# Patient Record
Sex: FEMALE | Race: WHITE | NOT HISPANIC OR LATINO | Employment: FULL TIME | URBAN - METROPOLITAN AREA
[De-identification: names, ages, dates, MRNs, and addresses within clinical notes are randomized per-mention and may not be internally consistent; named-entity substitution may affect disease eponyms.]

---

## 2022-09-01 ENCOUNTER — HOSPITAL ENCOUNTER (OUTPATIENT)
Facility: HOSPITAL | Age: 22
Discharge: HOME | End: 2022-09-01
Attending: EMERGENCY MEDICINE

## 2022-09-01 VITALS
RESPIRATION RATE: 18 BRPM | WEIGHT: 150 LBS | HEART RATE: 80 BPM | DIASTOLIC BLOOD PRESSURE: 70 MMHG | HEIGHT: 63 IN | SYSTOLIC BLOOD PRESSURE: 110 MMHG | OXYGEN SATURATION: 99 % | TEMPERATURE: 98 F | BODY MASS INDEX: 26.58 KG/M2

## 2022-09-01 DIAGNOSIS — U09.9 POST COVID-19 CONDITION, UNSPECIFIED: Primary | ICD-10-CM

## 2022-09-01 LAB
B-HCG UR QL: NEGATIVE
BILIRUB UR QL STRIP: ABNORMAL
CLARITY UR: ABNORMAL
COLOR UR: YELLOW
GLUCOSE UR STRIP-MCNC: NEGATIVE MG/DL
HGB UR QL STRIP.AUTO: NEGATIVE
KETONES UR QL STRIP: ABNORMAL
LEUKOCYTE ESTERASE UR QL STRIP.AUTO: ABNORMAL
NITRITE UR QL STRIP: NEGATIVE
PH UR STRIP.AUTO: 5.5 [PH] (ref 5–8)
PROT UR QL STRIP: ABNORMAL
SP GR UR STRIP: >=1.03 (ref 1–1.03)
UROBILINOGEN UR QL STRIP: ABNORMAL

## 2022-09-01 PROCEDURE — 81025 URINE PREGNANCY TEST: CPT | Performed by: EMERGENCY MEDICINE

## 2022-09-01 PROCEDURE — EDLOS: Performed by: EMERGENCY MEDICINE

## 2022-09-01 PROCEDURE — 81003 URINALYSIS AUTO W/O SCOPE: CPT | Performed by: EMERGENCY MEDICINE

## 2022-09-01 PROCEDURE — G0463 HOSPITAL OUTPT CLINIC VISIT: HCPCS | Performed by: EMERGENCY MEDICINE

## 2022-09-01 PROCEDURE — 63710000001 ONDANSETRON PER 8 MG: Performed by: EMERGENCY MEDICINE

## 2022-09-01 PROCEDURE — 99202 OFFICE O/P NEW SF 15 MIN: CPT | Performed by: EMERGENCY MEDICINE

## 2022-09-01 RX ORDER — ONDANSETRON 4 MG/1
4 TABLET, FILM COATED ORAL ONCE
Status: COMPLETED | OUTPATIENT
Start: 2022-09-01 | End: 2022-09-01

## 2022-09-01 RX ORDER — ONDANSETRON 4 MG/1
4 TABLET, ORALLY DISINTEGRATING ORAL EVERY 8 HOURS PRN
Qty: 8 TABLET | Refills: 0 | Status: SHIPPED | OUTPATIENT
Start: 2022-09-01

## 2022-09-01 RX ADMIN — ONDANSETRON HYDROCHLORIDE 4 MG: 4 TABLET, FILM COATED ORAL at 14:17

## 2023-04-22 ENCOUNTER — HOSPITAL ENCOUNTER (EMERGENCY)
Facility: HOSPITAL | Age: 23
Discharge: HOME | End: 2023-04-23
Attending: EMERGENCY MEDICINE
Payer: COMMERCIAL

## 2023-04-22 VITALS
RESPIRATION RATE: 18 BRPM | TEMPERATURE: 98.1 F | HEART RATE: 85 BPM | WEIGHT: 155 LBS | SYSTOLIC BLOOD PRESSURE: 103 MMHG | BODY MASS INDEX: 28.52 KG/M2 | DIASTOLIC BLOOD PRESSURE: 55 MMHG | HEIGHT: 62 IN | OXYGEN SATURATION: 100 %

## 2023-04-22 DIAGNOSIS — O21.9 NAUSEA AND VOMITING DURING PREGNANCY: Primary | ICD-10-CM

## 2023-04-22 DIAGNOSIS — E86.0 DEHYDRATION: ICD-10-CM

## 2023-04-22 LAB
ALBUMIN SERPL-MCNC: 4.1 G/DL (ref 3.5–5.2)
ALBUMIN/GLOB SERPL: 1.6 G/DL
ALP SERPL-CCNC: 51 U/L (ref 39–117)
ALT SERPL W P-5'-P-CCNC: 7 U/L (ref 1–33)
ANION GAP SERPL CALCULATED.3IONS-SCNC: 11.5 MMOL/L (ref 5–15)
AST SERPL-CCNC: 15 U/L (ref 1–32)
BASOPHILS # BLD AUTO: 0.02 10*3/MM3 (ref 0–0.2)
BASOPHILS NFR BLD AUTO: 0.3 % (ref 0–1.5)
BILIRUB SERPL-MCNC: 0.3 MG/DL (ref 0–1.2)
BILIRUB UR QL STRIP: NEGATIVE
BUN SERPL-MCNC: 5 MG/DL (ref 6–20)
BUN/CREAT SERPL: 11.1 (ref 7–25)
CALCIUM SPEC-SCNC: 8.9 MG/DL (ref 8.6–10.5)
CHLORIDE SERPL-SCNC: 102 MMOL/L (ref 98–107)
CLARITY UR: ABNORMAL
CO2 SERPL-SCNC: 20.5 MMOL/L (ref 22–29)
COLOR UR: YELLOW
CREAT SERPL-MCNC: 0.45 MG/DL (ref 0.57–1)
DEPRECATED RDW RBC AUTO: 45.7 FL (ref 37–54)
EGFRCR SERPLBLD CKD-EPI 2021: 140.6 ML/MIN/1.73
EOSINOPHIL # BLD AUTO: 0.01 10*3/MM3 (ref 0–0.4)
EOSINOPHIL NFR BLD AUTO: 0.1 % (ref 0.3–6.2)
ERYTHROCYTE [DISTWIDTH] IN BLOOD BY AUTOMATED COUNT: 14.2 % (ref 12.3–15.4)
GLOBULIN UR ELPH-MCNC: 2.6 GM/DL
GLUCOSE SERPL-MCNC: 87 MG/DL (ref 65–99)
GLUCOSE UR STRIP-MCNC: NEGATIVE MG/DL
HCT VFR BLD AUTO: 36.4 % (ref 34–46.6)
HGB BLD-MCNC: 11.8 G/DL (ref 12–15.9)
HGB UR QL STRIP.AUTO: NEGATIVE
IMM GRANULOCYTES # BLD AUTO: 0.01 10*3/MM3 (ref 0–0.05)
IMM GRANULOCYTES NFR BLD AUTO: 0.1 % (ref 0–0.5)
KETONES UR QL STRIP: ABNORMAL
LEUKOCYTE ESTERASE UR QL STRIP.AUTO: NEGATIVE
LIPASE SERPL-CCNC: 12 U/L (ref 13–60)
LYMPHOCYTES # BLD AUTO: 0.74 10*3/MM3 (ref 0.7–3.1)
LYMPHOCYTES NFR BLD AUTO: 10.9 % (ref 19.6–45.3)
MCH RBC QN AUTO: 28 PG (ref 26.6–33)
MCHC RBC AUTO-ENTMCNC: 32.4 G/DL (ref 31.5–35.7)
MCV RBC AUTO: 86.3 FL (ref 79–97)
MONOCYTES # BLD AUTO: 0.57 10*3/MM3 (ref 0.1–0.9)
MONOCYTES NFR BLD AUTO: 8.4 % (ref 5–12)
NEUTROPHILS NFR BLD AUTO: 5.43 10*3/MM3 (ref 1.7–7)
NEUTROPHILS NFR BLD AUTO: 80.2 % (ref 42.7–76)
NITRITE UR QL STRIP: NEGATIVE
PH UR STRIP.AUTO: 7 [PH] (ref 5–8)
PLATELET # BLD AUTO: 280 10*3/MM3 (ref 140–450)
PMV BLD AUTO: 9.6 FL (ref 6–12)
POTASSIUM SERPL-SCNC: 3.3 MMOL/L (ref 3.5–5.2)
PROT SERPL-MCNC: 6.7 G/DL (ref 6–8.5)
PROT UR QL STRIP: ABNORMAL
RBC # BLD AUTO: 4.22 10*6/MM3 (ref 3.77–5.28)
SODIUM SERPL-SCNC: 134 MMOL/L (ref 136–145)
SP GR UR STRIP: 1.02 (ref 1–1.03)
UROBILINOGEN UR QL STRIP: ABNORMAL
WBC NRBC COR # BLD: 6.78 10*3/MM3 (ref 3.4–10.8)

## 2023-04-22 PROCEDURE — 85025 COMPLETE CBC W/AUTO DIFF WBC: CPT | Performed by: EMERGENCY MEDICINE

## 2023-04-22 PROCEDURE — 80053 COMPREHEN METABOLIC PANEL: CPT | Performed by: EMERGENCY MEDICINE

## 2023-04-22 PROCEDURE — 96374 THER/PROPH/DIAG INJ IV PUSH: CPT

## 2023-04-22 PROCEDURE — 25010000002 ONDANSETRON PER 1 MG: Performed by: EMERGENCY MEDICINE

## 2023-04-22 PROCEDURE — 81003 URINALYSIS AUTO W/O SCOPE: CPT | Performed by: EMERGENCY MEDICINE

## 2023-04-22 PROCEDURE — 99283 EMERGENCY DEPT VISIT LOW MDM: CPT

## 2023-04-22 PROCEDURE — 83690 ASSAY OF LIPASE: CPT | Performed by: EMERGENCY MEDICINE

## 2023-04-22 RX ORDER — SODIUM CHLORIDE 0.9 % (FLUSH) 0.9 %
10 SYRINGE (ML) INJECTION AS NEEDED
Status: DISCONTINUED | OUTPATIENT
Start: 2023-04-22 | End: 2023-04-23 | Stop reason: HOSPADM

## 2023-04-22 RX ORDER — ONDANSETRON 2 MG/ML
8 INJECTION INTRAMUSCULAR; INTRAVENOUS ONCE
Status: COMPLETED | OUTPATIENT
Start: 2023-04-22 | End: 2023-04-22

## 2023-04-22 RX ADMIN — SODIUM CHLORIDE 1000 ML: 0.9 INJECTION, SOLUTION INTRAVENOUS at 23:30

## 2023-04-22 RX ADMIN — SODIUM CHLORIDE 1000 ML: 9 INJECTION, SOLUTION INTRAVENOUS at 22:07

## 2023-04-22 RX ADMIN — ONDANSETRON 8 MG: 2 INJECTION INTRAMUSCULAR; INTRAVENOUS at 22:06

## 2023-04-22 NOTE — Clinical Note
[facility] FSED [place] [id number] [place] [address] [place] [address]  Phone: [phone]    Alison Kaiser was seen and treated in our emergency department on 4/22/2023.  She may return to work on 04/24/2023.         Thank you for choosing [facility].    Jian Restrepo MD      
Never

## 2023-04-23 NOTE — FSED PROVIDER NOTE
Subjective   History of Present Illness  Patient 22-year-old woman who is approximate 11 weeks pregnant.  Patient states during her second pregnancy she had similar symptoms.  Patient has been having nausea and vomiting approximately 10-15 episodes today.  Patient noted some small streaks of blood in the emesis towards the end of her vomiting episodes.  She denies any diarrhea or fevers or abdominal pain or vaginal bleeding.  No dysuria.  Patient did reach out to her provider who did phone and Zofran however she has yet to pick this up due to insurance issues.  She feels that she will be able to  the medication tomorrow.        Review of Systems    Past Medical History:   Diagnosis Date   • Hyperemesis gravidarum        Allergies   Allergen Reactions   • Penicillins Other (See Comments)     Childhood allergy        History reviewed. No pertinent surgical history.    History reviewed. No pertinent family history.    Social History     Socioeconomic History   • Marital status:    Tobacco Use   • Smoking status: Never   • Smokeless tobacco: Never   Vaping Use   • Vaping Use: Every day   • Substances: Nicotine   • Devices: Disposable   Substance and Sexual Activity   • Alcohol use: Never   • Drug use: Yes     Frequency: 6.0 times per week     Types: Marijuana   • Sexual activity: Defer           Objective   Physical Exam  Vitals and nursing note reviewed.   Constitutional:       General: She is not in acute distress.     Appearance: Normal appearance. She is not ill-appearing or toxic-appearing.   HENT:      Head: Normocephalic and atraumatic.      Mouth/Throat:      Mouth: Mucous membranes are moist.      Pharynx: Oropharynx is clear.   Eyes:      Extraocular Movements: Extraocular movements intact.      Conjunctiva/sclera: Conjunctivae normal.      Pupils: Pupils are equal, round, and reactive to light.   Cardiovascular:      Rate and Rhythm: Normal rate and regular rhythm.      Pulses: Normal pulses.       Heart sounds: Normal heart sounds.   Pulmonary:      Effort: Pulmonary effort is normal.      Breath sounds: Normal breath sounds.   Abdominal:      General: Bowel sounds are normal.      Palpations: Abdomen is soft.      Tenderness: There is no abdominal tenderness. There is no right CVA tenderness, left CVA tenderness or guarding.   Musculoskeletal:         General: Normal range of motion.      Cervical back: Normal range of motion and neck supple.   Skin:     General: Skin is warm and dry.      Capillary Refill: Capillary refill takes less than 2 seconds.   Neurological:      General: No focal deficit present.      Mental Status: She is alert.      Sensory: No sensory deficit.      Motor: No weakness.         Procedures           ED Course                                           MDM   Patient with nausea and vomiting during her pregnancy.  She is approximate 11 weeks pregnant.  Patient states during her second pregnancy she had similar symptoms.  She had 10-15 episodes of vomiting today and noted some streaks of blood towards the end.  Does no diarrhea or fevers.  No difficulty voiding and no abdominal pain.  Regarding streaks of blood with her vomiting most likely Letitia-Cha syndrome.  Patient did feel dehydrated was given IV fluids with IV Zofran.  Patient was feeling better with the IV Zofran.  Labs were unremarkable.  Patient does have a prescription for Zofran which she feels she can  tomorrow.  Patient will return if symptoms worsen or any concerns.    Final diagnoses:   Nausea and vomiting during pregnancy   Dehydration       ED Disposition  ED Disposition     ED Disposition   Discharge    Condition   Stable    Comment   --             Hansel Valerio MD  [address] [place] [address] [phone]    In 1 week      [place] [address] [place] [id number] [phone]    If symptoms worsen         Medication  List      No changes were made to your prescriptions during this visit.

## 2023-04-23 NOTE — DISCHARGE INSTRUCTIONS
Please take Zofran as needed for nausea and vomiting.  Recommend clear liquids and ice chips for the next 6 to 12 hours and advance diet slowly as tolerated.  Seek immediate medical attention if having worsening symptoms or any concerns.

## 2023-04-23 NOTE — ED NOTES
"Pt is 11 wks pregnant . C/o of N/V . Reports 15 episodes today. HX hyperemesis Gravideria with 2 other pregnancies. \" Feeling dehydrated\" .  "

## 2023-04-30 ENCOUNTER — HOSPITAL ENCOUNTER (EMERGENCY)
Facility: HOSPITAL | Age: 23
Discharge: HOME | End: 2023-04-30
Attending: EMERGENCY MEDICINE | Admitting: EMERGENCY MEDICINE
Payer: COMMERCIAL

## 2023-04-30 VITALS
WEIGHT: 155 LBS | HEIGHT: 62 IN | RESPIRATION RATE: 18 BRPM | DIASTOLIC BLOOD PRESSURE: 60 MMHG | HEART RATE: 98 BPM | OXYGEN SATURATION: 98 % | SYSTOLIC BLOOD PRESSURE: 90 MMHG | TEMPERATURE: 98.4 F | BODY MASS INDEX: 28.52 KG/M2

## 2023-04-30 DIAGNOSIS — O21.9 NAUSEA AND VOMITING IN PREGNANCY PRIOR TO 22 WEEKS GESTATION: Primary | ICD-10-CM

## 2023-04-30 DIAGNOSIS — Z3A.13 PREGNANCY WITH 13 COMPLETED WEEKS GESTATION: ICD-10-CM

## 2023-04-30 LAB
ANION GAP SERPL CALCULATED.3IONS-SCNC: 9 MMOL/L (ref 5–15)
BASOPHILS # BLD AUTO: 0.02 10*3/MM3 (ref 0–0.2)
BASOPHILS NFR BLD AUTO: 0.2 % (ref 0–1.5)
BUN SERPL-MCNC: 5 MG/DL (ref 6–20)
BUN/CREAT SERPL: 10.6 (ref 7–25)
CALCIUM SPEC-SCNC: 9 MG/DL (ref 8.6–10.5)
CHLORIDE SERPL-SCNC: 103 MMOL/L (ref 98–107)
CO2 SERPL-SCNC: 22 MMOL/L (ref 22–29)
CREAT SERPL-MCNC: 0.47 MG/DL (ref 0.57–1)
DEPRECATED RDW RBC AUTO: 46 FL (ref 37–54)
EGFRCR SERPLBLD CKD-EPI 2021: 139.1 ML/MIN/1.73
EOSINOPHIL # BLD AUTO: 0.01 10*3/MM3 (ref 0–0.4)
EOSINOPHIL NFR BLD AUTO: 0.1 % (ref 0.3–6.2)
ERYTHROCYTE [DISTWIDTH] IN BLOOD BY AUTOMATED COUNT: 14.2 % (ref 12.3–15.4)
GLUCOSE SERPL-MCNC: 92 MG/DL (ref 65–99)
HCG INTACT+B SERPL-ACNC: NORMAL MIU/ML
HCT VFR BLD AUTO: 40.3 % (ref 34–46.6)
HGB BLD-MCNC: 13.1 G/DL (ref 12–15.9)
IMM GRANULOCYTES # BLD AUTO: 0.02 10*3/MM3 (ref 0–0.05)
IMM GRANULOCYTES NFR BLD AUTO: 0.2 % (ref 0–0.5)
LYMPHOCYTES # BLD AUTO: 1.06 10*3/MM3 (ref 0.7–3.1)
LYMPHOCYTES NFR BLD AUTO: 10.2 % (ref 19.6–45.3)
MCH RBC QN AUTO: 28.1 PG (ref 26.6–33)
MCHC RBC AUTO-ENTMCNC: 32.5 G/DL (ref 31.5–35.7)
MCV RBC AUTO: 86.3 FL (ref 79–97)
MONOCYTES # BLD AUTO: 0.38 10*3/MM3 (ref 0.1–0.9)
MONOCYTES NFR BLD AUTO: 3.6 % (ref 5–12)
NEUTROPHILS NFR BLD AUTO: 8.95 10*3/MM3 (ref 1.7–7)
NEUTROPHILS NFR BLD AUTO: 85.7 % (ref 42.7–76)
PLATELET # BLD AUTO: 326 10*3/MM3 (ref 140–450)
PMV BLD AUTO: 9.5 FL (ref 6–12)
POTASSIUM SERPL-SCNC: 3.7 MMOL/L (ref 3.5–5.2)
RBC # BLD AUTO: 4.67 10*6/MM3 (ref 3.77–5.28)
SODIUM SERPL-SCNC: 134 MMOL/L (ref 136–145)
WBC NRBC COR # BLD: 10.44 10*3/MM3 (ref 3.4–10.8)

## 2023-04-30 PROCEDURE — 96374 THER/PROPH/DIAG INJ IV PUSH: CPT

## 2023-04-30 PROCEDURE — 80048 BASIC METABOLIC PNL TOTAL CA: CPT | Performed by: NURSE PRACTITIONER

## 2023-04-30 PROCEDURE — 99283 EMERGENCY DEPT VISIT LOW MDM: CPT

## 2023-04-30 PROCEDURE — 85025 COMPLETE CBC W/AUTO DIFF WBC: CPT | Performed by: NURSE PRACTITIONER

## 2023-04-30 PROCEDURE — 84702 CHORIONIC GONADOTROPIN TEST: CPT | Performed by: NURSE PRACTITIONER

## 2023-04-30 PROCEDURE — 96361 HYDRATE IV INFUSION ADD-ON: CPT

## 2023-04-30 PROCEDURE — 25010000002 ONDANSETRON PER 1 MG: Performed by: NURSE PRACTITIONER

## 2023-04-30 RX ORDER — DEXTROSE AND SODIUM CHLORIDE 5; .9 G/100ML; G/100ML
500 INJECTION, SOLUTION INTRAVENOUS CONTINUOUS
Status: DISCONTINUED | OUTPATIENT
Start: 2023-04-30 | End: 2023-04-30 | Stop reason: HOSPADM

## 2023-04-30 RX ORDER — ONDANSETRON 2 MG/ML
8 INJECTION INTRAMUSCULAR; INTRAVENOUS ONCE
Status: COMPLETED | OUTPATIENT
Start: 2023-04-30 | End: 2023-04-30

## 2023-04-30 RX ORDER — SODIUM CHLORIDE 0.9 % (FLUSH) 0.9 %
10 SYRINGE (ML) INJECTION AS NEEDED
Status: DISCONTINUED | OUTPATIENT
Start: 2023-04-30 | End: 2023-04-30 | Stop reason: HOSPADM

## 2023-04-30 RX ADMIN — DEXTROSE AND SODIUM CHLORIDE 500 ML/HR: 5; .9 INJECTION, SOLUTION INTRAVENOUS at 17:19

## 2023-04-30 RX ADMIN — ONDANSETRON 8 MG: 2 INJECTION INTRAMUSCULAR; INTRAVENOUS at 17:19

## 2023-04-30 NOTE — DISCHARGE INSTRUCTIONS
THIS LIST WILL HELP YOU TO KNOW SOME OVER-THE-COUNTER MEDICATIONS YOU CAN TAKE DURING PREGNANCY    (Lista de medicinas que puede arben samuel el embarazo)    COLD (Cararro):       PAINS AND ACHES (Ame):  Robitussin      Tylenol   Actifed       Tylenol extra strength  Tylenol Cold          DIARRHEA (diarrea):    HEADACHES (dolor de dileep):  Fibercon      Tylenol extra strength  Clear liquids or jello     HEARTBURN/INDIGESTION:   SWELLING (Inchazon):  Maalox, Mylanta, Tums, Zantac   No salt         (inchazon) no fast food or junk food         No sodas         No canned soups    TO SLEEP (Para dormir):    YEAST INFECTION:  Tylenol PM      Monistat 7 or Monistat 3 (generic ok)  Benadryl 25 mg      CONSTIPATION:  Metamucil  Eat very little rice (coma poco arroz)  Add in Prune or Prune juice (Ciruelas, jugo de cireuelas)  8 glasses of water daily (8 vasos de agua al raffy)  Colace (stool softners) generic ok    HEMORRHOIDS (Hemorroides):   SEASONAL ALLERGIES:  Preparation H, Anusol     Benadryl, Zyrtec, Claritin               Return Precautions    Although you are being discharged from the ED today, I encourage you to return for worsening symptoms.  Things can, and do, change such that treatment at home with medication may not be adequate.      Specifically, return for any of the following:    Chest pain, shortness of breath, pain or nausea and vomiting not controlled by medications provided.    Please make a follow up with your Primary Care Provider for a blood pressure recheck.

## 2023-05-07 ENCOUNTER — HOSPITAL ENCOUNTER (EMERGENCY)
Facility: HOSPITAL | Age: 23
Discharge: HOME | End: 2023-05-07
Attending: EMERGENCY MEDICINE | Admitting: EMERGENCY MEDICINE
Payer: COMMERCIAL

## 2023-05-07 VITALS
RESPIRATION RATE: 19 BRPM | OXYGEN SATURATION: 98 % | DIASTOLIC BLOOD PRESSURE: 68 MMHG | TEMPERATURE: 98.3 F | HEIGHT: 62 IN | HEART RATE: 125 BPM | SYSTOLIC BLOOD PRESSURE: 102 MMHG | BODY MASS INDEX: 26.68 KG/M2 | WEIGHT: 145 LBS

## 2023-05-07 DIAGNOSIS — O21.0 HYPEREMESIS GRAVIDARUM: Primary | ICD-10-CM

## 2023-05-07 LAB
ALBUMIN SERPL-MCNC: 3.9 G/DL (ref 3.5–5.2)
ALBUMIN/GLOB SERPL: 1.4 G/DL
ALP SERPL-CCNC: 50 U/L (ref 39–117)
ALT SERPL W P-5'-P-CCNC: 5 U/L (ref 1–33)
ANION GAP SERPL CALCULATED.3IONS-SCNC: 10 MMOL/L (ref 5–15)
AST SERPL-CCNC: 10 U/L (ref 1–32)
BASOPHILS # BLD AUTO: 0 10*3/MM3 (ref 0–0.2)
BASOPHILS NFR BLD AUTO: 0 % (ref 0–1.5)
BILIRUB SERPL-MCNC: 0.3 MG/DL (ref 0–1.2)
BUN SERPL-MCNC: 4 MG/DL (ref 6–20)
BUN/CREAT SERPL: 8.5 (ref 7–25)
CALCIUM SPEC-SCNC: 8.9 MG/DL (ref 8.6–10.5)
CHLORIDE SERPL-SCNC: 102 MMOL/L (ref 98–107)
CO2 SERPL-SCNC: 21 MMOL/L (ref 22–29)
CREAT SERPL-MCNC: 0.47 MG/DL (ref 0.57–1)
DEPRECATED RDW RBC AUTO: 46.6 FL (ref 37–54)
EGFRCR SERPLBLD CKD-EPI 2021: 139.1 ML/MIN/1.73
EOSINOPHIL # BLD AUTO: 0 10*3/MM3 (ref 0–0.4)
EOSINOPHIL NFR BLD AUTO: 0 % (ref 0.3–6.2)
ERYTHROCYTE [DISTWIDTH] IN BLOOD BY AUTOMATED COUNT: 14.5 % (ref 12.3–15.4)
GLOBULIN UR ELPH-MCNC: 2.8 GM/DL
GLUCOSE SERPL-MCNC: 87 MG/DL (ref 65–99)
HCT VFR BLD AUTO: 37 % (ref 34–46.6)
HGB BLD-MCNC: 12 G/DL (ref 12–15.9)
IMM GRANULOCYTES # BLD AUTO: 0.02 10*3/MM3 (ref 0–0.05)
IMM GRANULOCYTES NFR BLD AUTO: 0.2 % (ref 0–0.5)
LYMPHOCYTES # BLD AUTO: 0.87 10*3/MM3 (ref 0.7–3.1)
LYMPHOCYTES NFR BLD AUTO: 9 % (ref 19.6–45.3)
MCH RBC QN AUTO: 27.9 PG (ref 26.6–33)
MCHC RBC AUTO-ENTMCNC: 32.4 G/DL (ref 31.5–35.7)
MCV RBC AUTO: 86 FL (ref 79–97)
MONOCYTES # BLD AUTO: 0.73 10*3/MM3 (ref 0.1–0.9)
MONOCYTES NFR BLD AUTO: 7.5 % (ref 5–12)
NEUTROPHILS NFR BLD AUTO: 8.07 10*3/MM3 (ref 1.7–7)
NEUTROPHILS NFR BLD AUTO: 83.3 % (ref 42.7–76)
PLATELET # BLD AUTO: 263 10*3/MM3 (ref 140–450)
PMV BLD AUTO: 10.2 FL (ref 6–12)
POTASSIUM SERPL-SCNC: 3.4 MMOL/L (ref 3.5–5.2)
PROT SERPL-MCNC: 6.7 G/DL (ref 6–8.5)
RBC # BLD AUTO: 4.3 10*6/MM3 (ref 3.77–5.28)
SODIUM SERPL-SCNC: 133 MMOL/L (ref 136–145)
WBC NRBC COR # BLD: 9.69 10*3/MM3 (ref 3.4–10.8)

## 2023-05-07 PROCEDURE — 96375 TX/PRO/DX INJ NEW DRUG ADDON: CPT

## 2023-05-07 PROCEDURE — 96361 HYDRATE IV INFUSION ADD-ON: CPT

## 2023-05-07 PROCEDURE — 85025 COMPLETE CBC W/AUTO DIFF WBC: CPT | Performed by: EMERGENCY MEDICINE

## 2023-05-07 PROCEDURE — 96374 THER/PROPH/DIAG INJ IV PUSH: CPT

## 2023-05-07 PROCEDURE — 25010000002 METOCLOPRAMIDE PER 10 MG: Performed by: EMERGENCY MEDICINE

## 2023-05-07 PROCEDURE — 99283 EMERGENCY DEPT VISIT LOW MDM: CPT

## 2023-05-07 PROCEDURE — 25010000002 DIPHENHYDRAMINE PER 50 MG: Performed by: EMERGENCY MEDICINE

## 2023-05-07 PROCEDURE — 80053 COMPREHEN METABOLIC PANEL: CPT | Performed by: EMERGENCY MEDICINE

## 2023-05-07 RX ORDER — SODIUM CHLORIDE, SODIUM LACTATE, POTASSIUM CHLORIDE, CALCIUM CHLORIDE 600; 310; 30; 20 MG/100ML; MG/100ML; MG/100ML; MG/100ML
1000 INJECTION, SOLUTION INTRAVENOUS CONTINUOUS
Status: DISPENSED | OUTPATIENT
Start: 2023-05-07 | End: 2023-05-07

## 2023-05-07 RX ORDER — DIPHENHYDRAMINE HYDROCHLORIDE 50 MG/ML
25 INJECTION INTRAMUSCULAR; INTRAVENOUS ONCE
Status: COMPLETED | OUTPATIENT
Start: 2023-05-07 | End: 2023-05-07

## 2023-05-07 RX ORDER — METOCLOPRAMIDE HYDROCHLORIDE 5 MG/ML
10 INJECTION INTRAMUSCULAR; INTRAVENOUS ONCE
Status: COMPLETED | OUTPATIENT
Start: 2023-05-07 | End: 2023-05-07

## 2023-05-07 RX ORDER — ONDANSETRON 4 MG/1
4 TABLET, ORALLY DISINTEGRATING ORAL EVERY 6 HOURS PRN
Qty: 12 TABLET | Refills: 0 | Status: SHIPPED | OUTPATIENT
Start: 2023-05-07

## 2023-05-07 RX ORDER — DOXYLAMINE SUCCINATE AND PYRIDOXINE HYDROCHLORIDE, DELAYED RELEASE TABLETS 10 MG/10 MG 10; 10 MG/1; MG/1
2 TABLET, DELAYED RELEASE ORAL 2 TIMES DAILY PRN
Qty: 30 TABLET | Refills: 0 | Status: SHIPPED | OUTPATIENT
Start: 2023-05-07

## 2023-05-07 RX ADMIN — METOCLOPRAMIDE HYDROCHLORIDE 10 MG: 5 INJECTION INTRAMUSCULAR; INTRAVENOUS at 16:48

## 2023-05-07 RX ADMIN — DIPHENHYDRAMINE HYDROCHLORIDE 25 MG: 50 INJECTION, SOLUTION INTRAMUSCULAR; INTRAVENOUS at 16:48

## 2023-05-07 RX ADMIN — SODIUM CHLORIDE, POTASSIUM CHLORIDE, SODIUM LACTATE AND CALCIUM CHLORIDE 1000 ML/HR: 600; 310; 30; 20 INJECTION, SOLUTION INTRAVENOUS at 16:49

## 2023-05-07 NOTE — DISCHARGE INSTRUCTIONS
Take medication as prescribed for nausea and vomiting.  Return to the emergency room for any concerns.  Follow-up with PCP as needed.  Ice chips, clear liquid diet, slowly advance as tolerated.

## 2023-05-07 NOTE — FSED PROVIDER NOTE
Subjective   History of Present Illness  The patient is a 22-year-old female who is a G3, P2 at approximately 15 weeks gestation, who presents emergency room with complaints of nausea and vomiting related to her pregnancy.  Patient states that she had the same thing with her previous daughter.  She reports that her symptoms are so bad that she has a Zofran pump that she uses at home.  Patient reports severe upper abdominal cramping and nausea with vomiting.  Patient has had frequent episodes of emesis.  She states that her Zofran is not helping.  She is here for evaluation.        Review of Systems   Constitutional: Negative.  Negative for chills, fatigue and fever.   Eyes: Negative.    Respiratory: Negative for cough, chest tightness and shortness of breath.    Cardiovascular: Negative for chest pain and palpitations.   Gastrointestinal: Positive for abdominal pain, nausea and vomiting. Negative for diarrhea.   Genitourinary: Negative.    Musculoskeletal: Negative.    Skin: Negative.  Negative for rash.   Neurological: Negative.  Negative for syncope, weakness, numbness and headaches.   Psychiatric/Behavioral: Negative.    All other systems reviewed and are negative.      Past Medical History:   Diagnosis Date   • Hyperemesis gravidarum        Allergies   Allergen Reactions   • Penicillins Other (See Comments)     Childhood allergy        History reviewed. No pertinent surgical history.    History reviewed. No pertinent family history.    Social History     Socioeconomic History   • Marital status:    Tobacco Use   • Smoking status: Never   • Smokeless tobacco: Never   Vaping Use   • Vaping Use: Every day   • Substances: Nicotine   • Devices: Disposable   Substance and Sexual Activity   • Alcohol use: Never   • Drug use: Yes     Frequency: 6.0 times per week     Types: Marijuana   • Sexual activity: Defer           Objective   Physical Exam  Vitals and nursing note reviewed.   Constitutional:       General:  She is not in acute distress.     Appearance: Normal appearance. She is normal weight.   HENT:      Right Ear: External ear normal.      Left Ear: External ear normal.      Nose: Nose normal.   Neck:      Vascular: No carotid bruit.   Cardiovascular:      Rate and Rhythm: Regular rhythm. Tachycardia present.      Pulses: Normal pulses.   Pulmonary:      Effort: Pulmonary effort is normal.   Abdominal:      General: Abdomen is flat. Bowel sounds are normal. There is no distension.      Palpations: There is no mass.      Tenderness: There is no abdominal tenderness. There is no guarding.   Musculoskeletal:         General: Normal range of motion.      Cervical back: Normal range of motion.   Skin:     Capillary Refill: Capillary refill takes less than 2 seconds.   Neurological:      General: No focal deficit present.      Mental Status: She is alert and oriented to person, place, and time.   Psychiatric:         Mood and Affect: Mood normal.         Procedures           ED Course  ED Course as of 05/08/23 0736   Sun May 07, 2023   1712 IV fluids ordered.  Reglan and Benadryl for nausea.  Labs requested. [KZ]   1752 Labs look fine.  No significant abnormality. [KZ]      ED Course User Index  [KZ] Lam Lawson MD                                           Medical Decision Making  This patient with nausea and vomiting which is likely secondary to hyperemesis gravidarum.  Considered but low risk for SBO (normal BM, passing flatus, no abdominal surgeries).   Low suspicion for gastric or esophageal dysmotility as cause. Patient with no chest pain. Based on history and exam low suspicion for pancreatitis, appendicitis, biliary pathology, or other emergent problem.  Screening labs have been requested.  Patient has been given IV fluids and IV antiemetics.  Further work-up based on lab testing results.    Labs look fine.  Patient feels better after fluids.      Hyperemesis gravidarum: chronic illness or injury with  exacerbation, progression, or side effects of treatment  Amount and/or Complexity of Data Reviewed  Labs: ordered. Decision-making details documented in ED Course.      Risk  Prescription drug management.          Final diagnoses:   Hyperemesis gravidarum       ED Disposition  ED Disposition     ED Disposition   Discharge    Condition   Stable    Comment   --             Your obstetrician    Schedule an appointment as soon as possible for a visit in 2 days  For repeat evaluation         Medication List      New Prescriptions    doxylamine-pyridoxine 10-10 MG tablet delayed-release EC tablet  Commonly known as: DICLEGIS  Take 2 tablets by mouth 2 (Two) Times a Day As Needed (nausea and vomiting).        Changed    ondansetron ODT 4 MG disintegrating tablet  Commonly known as: ZOFRAN-ODT  Place 1 tablet on the tongue Every 6 (Six) Hours As Needed for Nausea or Vomiting.  What changed: when to take this           Where to Get Your Medications      These medications were sent to [facility] [id number] - [place], [place] - [address] [facility] [phone]  - [phone] FX  [address] [place] [address]    Phone: [phone]   · doxylamine-pyridoxine 10-10 MG tablet delayed-release EC tablet  · ondansetron ODT 4 MG disintegrating tablet

## 2023-11-09 ENCOUNTER — HOSPITAL ENCOUNTER (EMERGENCY)
Facility: HOSPITAL | Age: 23
Discharge: HOME | End: 2023-11-09
Attending: EMERGENCY MEDICINE
Payer: COMMERCIAL

## 2023-11-09 VITALS
OXYGEN SATURATION: 97 % | BODY MASS INDEX: 23.92 KG/M2 | RESPIRATION RATE: 20 BRPM | HEART RATE: 110 BPM | TEMPERATURE: 98.3 F | HEIGHT: 62 IN | WEIGHT: 130 LBS | DIASTOLIC BLOOD PRESSURE: 72 MMHG | SYSTOLIC BLOOD PRESSURE: 102 MMHG

## 2023-11-09 DIAGNOSIS — R11.2 NAUSEA AND VOMITING, UNSPECIFIED VOMITING TYPE: Primary | ICD-10-CM

## 2023-11-09 LAB
ALBUMIN SERPL-MCNC: 4.4 G/DL (ref 3.5–5.2)
ALBUMIN/GLOB SERPL: 1.6 G/DL
ALP SERPL-CCNC: 92 U/L (ref 39–117)
ALT SERPL W P-5'-P-CCNC: 15 U/L (ref 1–33)
ANION GAP SERPL CALCULATED.3IONS-SCNC: 10.2 MMOL/L (ref 5–15)
AST SERPL-CCNC: 19 U/L (ref 1–32)
B-HCG UR QL: NEGATIVE
BASOPHILS # BLD AUTO: 0 10*3/MM3 (ref 0–0.2)
BASOPHILS NFR BLD AUTO: 0.2 % (ref 0–1.5)
BILIRUB SERPL-MCNC: 0.3 MG/DL (ref 0–1.2)
BILIRUB UR QL STRIP: NEGATIVE
BUN SERPL-MCNC: 15 MG/DL (ref 6–20)
BUN/CREAT SERPL: 19.5 (ref 7–25)
CALCIUM SPEC-SCNC: 9.4 MG/DL (ref 8.6–10.5)
CHLORIDE SERPL-SCNC: 102 MMOL/L (ref 98–107)
CLARITY UR: ABNORMAL
CO2 SERPL-SCNC: 27.8 MMOL/L (ref 22–29)
COLOR UR: ABNORMAL
CREAT SERPL-MCNC: 0.77 MG/DL (ref 0.57–1)
DEPRECATED RDW RBC AUTO: 52.9 FL (ref 37–54)
EGFRCR SERPLBLD CKD-EPI 2021: 112 ML/MIN/1.73
EOSINOPHIL # BLD AUTO: 0.2 10*3/MM3 (ref 0–0.4)
EOSINOPHIL NFR BLD AUTO: 1.1 % (ref 0.3–6.2)
ERYTHROCYTE [DISTWIDTH] IN BLOOD BY AUTOMATED COUNT: 18.3 % (ref 12.3–15.4)
GLOBULIN UR ELPH-MCNC: 2.8 GM/DL
GLUCOSE SERPL-MCNC: 105 MG/DL (ref 65–99)
GLUCOSE UR STRIP-MCNC: NEGATIVE MG/DL
HCT VFR BLD AUTO: 44 % (ref 34–46.6)
HGB BLD-MCNC: 14.2 G/DL (ref 12–15.9)
HGB UR QL STRIP.AUTO: ABNORMAL
KETONES UR QL STRIP: ABNORMAL
LEUKOCYTE ESTERASE UR QL STRIP.AUTO: ABNORMAL
LYMPHOCYTES # BLD AUTO: 1.7 10*3/MM3 (ref 0.7–3.1)
LYMPHOCYTES NFR BLD AUTO: 11.7 % (ref 19.6–45.3)
MCH RBC QN AUTO: 25.3 PG (ref 26.6–33)
MCHC RBC AUTO-ENTMCNC: 32.4 G/DL (ref 31.5–35.7)
MCV RBC AUTO: 78.2 FL (ref 79–97)
MONOCYTES # BLD AUTO: 0.6 10*3/MM3 (ref 0.1–0.9)
MONOCYTES NFR BLD AUTO: 4.4 % (ref 5–12)
NEUTROPHILS NFR BLD AUTO: 11.9 10*3/MM3 (ref 1.7–7)
NEUTROPHILS NFR BLD AUTO: 82.6 % (ref 42.7–76)
NITRITE UR QL STRIP: NEGATIVE
NRBC BLD AUTO-RTO: 0.1 /100 WBC (ref 0–0.2)
PH UR STRIP.AUTO: 5.5 [PH] (ref 5–8)
PLATELET # BLD AUTO: 358 10*3/MM3 (ref 140–450)
PMV BLD AUTO: 7.8 FL (ref 6–12)
POTASSIUM SERPL-SCNC: 4.3 MMOL/L (ref 3.5–5.2)
PROT SERPL-MCNC: 7.2 G/DL (ref 6–8.5)
PROT UR QL STRIP: ABNORMAL
RBC # BLD AUTO: 5.63 10*6/MM3 (ref 3.77–5.28)
SODIUM SERPL-SCNC: 140 MMOL/L (ref 136–145)
SP GR UR STRIP: >=1.03 (ref 1–1.03)
UROBILINOGEN UR QL STRIP: ABNORMAL
WBC NRBC COR # BLD: 14.4 10*3/MM3 (ref 3.4–10.8)

## 2023-11-09 PROCEDURE — 96374 THER/PROPH/DIAG INJ IV PUSH: CPT

## 2023-11-09 PROCEDURE — 25010000002 ONDANSETRON PER 1 MG

## 2023-11-09 PROCEDURE — 81025 URINE PREGNANCY TEST: CPT

## 2023-11-09 PROCEDURE — 85025 COMPLETE CBC W/AUTO DIFF WBC: CPT

## 2023-11-09 PROCEDURE — 25810000003 SODIUM CHLORIDE 0.9 % SOLUTION

## 2023-11-09 PROCEDURE — 96375 TX/PRO/DX INJ NEW DRUG ADDON: CPT

## 2023-11-09 PROCEDURE — 25010000002 KETOROLAC TROMETHAMINE PER 15 MG

## 2023-11-09 PROCEDURE — 99283 EMERGENCY DEPT VISIT LOW MDM: CPT

## 2023-11-09 PROCEDURE — 81003 URINALYSIS AUTO W/O SCOPE: CPT

## 2023-11-09 PROCEDURE — 80053 COMPREHEN METABOLIC PANEL: CPT

## 2023-11-09 PROCEDURE — 96361 HYDRATE IV INFUSION ADD-ON: CPT

## 2023-11-09 RX ORDER — ONDANSETRON 4 MG/1
8 TABLET, ORALLY DISINTEGRATING ORAL EVERY 8 HOURS PRN
Qty: 12 TABLET | Refills: 0 | Status: SHIPPED | OUTPATIENT
Start: 2023-11-09

## 2023-11-09 RX ORDER — PROMETHAZINE HYDROCHLORIDE 25 MG/1
25 SUPPOSITORY RECTAL EVERY 6 HOURS PRN
Qty: 12 SUPPOSITORY | Refills: 0 | Status: SHIPPED | OUTPATIENT
Start: 2023-11-09

## 2023-11-09 RX ORDER — KETOROLAC TROMETHAMINE 30 MG/ML
30 INJECTION, SOLUTION INTRAMUSCULAR; INTRAVENOUS ONCE
Status: COMPLETED | OUTPATIENT
Start: 2023-11-09 | End: 2023-11-09

## 2023-11-09 RX ORDER — ONDANSETRON 2 MG/ML
8 INJECTION INTRAMUSCULAR; INTRAVENOUS ONCE
Status: COMPLETED | OUTPATIENT
Start: 2023-11-09 | End: 2023-11-09

## 2023-11-09 RX ORDER — SODIUM CHLORIDE 0.9 % (FLUSH) 0.9 %
10 SYRINGE (ML) INJECTION AS NEEDED
Status: DISCONTINUED | OUTPATIENT
Start: 2023-11-09 | End: 2023-11-09 | Stop reason: HOSPADM

## 2023-11-09 RX ORDER — FAMOTIDINE 10 MG/ML
20 INJECTION, SOLUTION INTRAVENOUS ONCE
Status: COMPLETED | OUTPATIENT
Start: 2023-11-09 | End: 2023-11-09

## 2023-11-09 RX ADMIN — KETOROLAC TROMETHAMINE 30 MG: 30 INJECTION, SOLUTION INTRAMUSCULAR; INTRAVENOUS at 18:10

## 2023-11-09 RX ADMIN — ONDANSETRON 8 MG: 2 INJECTION INTRAMUSCULAR; INTRAVENOUS at 18:10

## 2023-11-09 RX ADMIN — SODIUM CHLORIDE 1000 ML: 9 INJECTION, SOLUTION INTRAVENOUS at 18:11

## 2023-11-09 RX ADMIN — FAMOTIDINE 20 MG: 10 INJECTION, SOLUTION INTRAVENOUS at 18:10

## 2023-11-09 NOTE — FSED PROVIDER NOTE
Subjective   History of Present Illness  Patient is a 23-year-old female who presents to the emergency department for vomiting that onset several hours prior to arrival around 2 PM.  Associated body aches, cold chills, hot sweats.  Patient reports she was feeling fine prior and over the past several days.  Denies abdominal pain, diarrhea, constipation, any upper respiratory symptoms.  Patient recently gave birth x5 weeks ago.  Lives in sober living group home.  Reports problem with chronic constipation and irregular bowel movements.  Takes stool softener daily.  Denies any acute concerns.  Last bowel movement yesterday.        Review of Systems   Constitutional:  Positive for chills. Negative for fatigue and fever.   HENT:  Negative for congestion, rhinorrhea and sore throat.    Eyes:  Negative for pain and redness.   Respiratory:  Negative for cough and shortness of breath.    Cardiovascular:  Negative for chest pain.   Gastrointestinal:  Positive for nausea and vomiting. Negative for abdominal pain, constipation and diarrhea.   Genitourinary:  Positive for vaginal bleeding. Negative for decreased urine volume, difficulty urinating and dysuria.   Musculoskeletal:  Positive for myalgias. Negative for back pain, neck pain and neck stiffness.   Skin:  Negative for color change, pallor, rash and wound.   Neurological:  Negative for syncope, weakness, light-headedness and numbness.       Past Medical History:   Diagnosis Date    Hyperemesis gravidarum        No Known Allergies    History reviewed. No pertinent surgical history.    History reviewed. No pertinent family history.    Social History     Socioeconomic History    Marital status:    Tobacco Use    Smoking status: Never    Smokeless tobacco: Never   Vaping Use    Vaping Use: Every day    Substances: Nicotine    Devices: Disposable   Substance and Sexual Activity    Alcohol use: Never    Drug use: Yes     Frequency: 6.0 times per week     Types: Marijuana     Sexual activity: Defer           Objective   Physical Exam  Constitutional:       Appearance: She is normal weight. She is ill-appearing. She is not toxic-appearing.      Comments: Patient lying in fetal position covered in blanket and emesis bag at side.   HENT:      Head: Normocephalic and atraumatic.   Pulmonary:      Effort: Pulmonary effort is normal. No respiratory distress.   Abdominal:      General: Abdomen is flat. There is no distension.      Palpations: Abdomen is soft. There is no mass.      Tenderness: There is no abdominal tenderness. There is no guarding or rebound.      Hernia: No hernia is present.   Musculoskeletal:         General: Normal range of motion.      Cervical back: Normal range of motion.   Skin:     General: Skin is warm and dry.      Findings: No bruising, erythema, lesion or rash.   Neurological:      General: No focal deficit present.      Mental Status: She is alert and oriented to person, place, and time.   Psychiatric:         Mood and Affect: Mood normal.         Behavior: Behavior normal.         Procedures           ED Course  ED Course as of 11/09/23 1952   Thu Nov 09, 2023 1931 Patient reports feeling significantly improved after fluids, Pepcid, Toradol, Zofran IV.  CMP unremarkable.  Urine reveals trace leukocytes and ketones.  Other findings of proteinuria, blood likely due to patient still spotting from recent vaginal delivery.  No urinary symptoms.  Will wait for urine culture.  Unable to result CBC due to equipment malfunction and has to be send out.  Patient symptoms onset just prior to arrival and there are no acute findings on exam unlikely to have major bacterial surgical or medical emergency.  More likely viral illness.  Strict return precautions given.  Will discharge patient.  Very thorough hygiene practices discussed and emphasized due to patient's group living and 5-week-old baby. [AS]      ED Course User Index  [AS] Giuliana Kaplan, SHABANA                                            Medical Decision Making  Patient is a 23-year-old female who presents for vomiting, chills, body aches that onset just prior to arrival.  Patient appears ill but nontoxic.  Vital signs are WNL mildly tachycardic.  Abdomen is soft, nontender.  Patient significantly improved after IV fluids and medications.  CMP unremarkable.  CBC not resulted due to equipment malfunction.  However, considered low risk for bacterial illness in this clinical picture.  Will discharge without.  Thoroughly discussed hygiene measures to prevent infection of other roommates and baby.  Discussed when to return to the emergency department.  Answered all questions.  Patient verbalized understanding and was agreeable to plan and discharge.    My differential diagnosis includes but is not limited to: Viral illness, bacterial illness, gastritis, food poisoning, electrolyte abnormality, allergy, food intolerance, SBO, intussusception    Amount and/or Complexity of Data Reviewed  Labs: ordered.    Risk  Prescription drug management.        Final diagnoses:   Nausea and vomiting, unspecified vomiting type       ED Disposition  ED Disposition       ED Disposition   Discharge    Condition   Stable    Comment   --               Hansel Valerio MD  [address] [place] [address]  [phone]    Schedule an appointment as soon as possible for a visit            Medication List        New Prescriptions      promethazine 25 MG suppository  Commonly known as: PHENERGAN  Insert 1 suppository into the rectum Every 6 (Six) Hours As Needed for Nausea or Vomiting.            Changed      ondansetron ODT 4 MG disintegrating tablet  Commonly known as: ZOFRAN-ODT  Place 2 tablets on the tongue Every 8 (Eight) Hours As Needed for Nausea or Vomiting.  What changed:   how much to take  when to take this               Where to Get Your Medications        These medications were sent to [facility] [id number] -  [place], [place] - [address] [facility] - [phone]  - [phone] FX  [address] [facility], [place] [address]      Phone: [phone]   ondansetron ODT 4 MG disintegrating tablet  promethazine 25 MG suppository

## 2023-11-10 NOTE — DISCHARGE INSTRUCTIONS
Return to emergency department for worsening symptoms or other medical emergencies.  Recommended follow-up with PCP.  Keep appointment with OB/GYN.  Refer to the attached instructions for further information.    Patient okay to take:  -Ibuprofen 200 mg tablets: 1 to 4 tablets every 8 hours as needed for fever or pain  -Tums: 1 to 2 tablets twice a day as needed for upset stomach  -Phenergan and Zofran as prescribed as needed for nausea and vomiting

## 2024-04-03 ENCOUNTER — HOSPITAL ENCOUNTER (OUTPATIENT)
Facility: HOSPITAL | Age: 24
Discharge: HOME | End: 2024-04-03
Attending: EMERGENCY MEDICINE | Admitting: EMERGENCY MEDICINE
Payer: MEDICAID

## 2024-04-03 ENCOUNTER — APPOINTMENT (OUTPATIENT)
Dept: GENERAL RADIOLOGY | Facility: HOSPITAL | Age: 24
End: 2024-04-03
Payer: MEDICAID

## 2024-04-03 VITALS
SYSTOLIC BLOOD PRESSURE: 115 MMHG | DIASTOLIC BLOOD PRESSURE: 68 MMHG | RESPIRATION RATE: 18 BRPM | HEART RATE: 106 BPM | BODY MASS INDEX: 25.16 KG/M2 | TEMPERATURE: 98.4 F | WEIGHT: 142 LBS | OXYGEN SATURATION: 99 % | HEIGHT: 63 IN

## 2024-04-03 DIAGNOSIS — S32.10XA SACRUM AND COCCYX FRACTURE, CLOSED, INITIAL ENCOUNTER: ICD-10-CM

## 2024-04-03 DIAGNOSIS — S32.2XXA SACRUM AND COCCYX FRACTURE, CLOSED, INITIAL ENCOUNTER: ICD-10-CM
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
## 2024-04-03 PROCEDURE — 99213 OFFICE O/P EST LOW 20 MIN: CPT | Performed by: NURSE PRACTITIONER

## 2024-04-03 PROCEDURE — 72220 X-RAY EXAM SACRUM TAILBONE: CPT

## 2024-04-03 PROCEDURE — G0463 HOSPITAL OUTPT CLINIC VISIT: HCPCS | Performed by: NURSE PRACTITIONER

## 2024-04-03 RX ORDER — ACETAMINOPHEN 500 MG
1000 TABLET ORAL EVERY 6 HOURS PRN
Qty: 240 TABLET | Refills: 0 | Status: SHIPPED | OUTPATIENT
Start: 2024-04-03 | End: 2024-05-03

## 2024-04-03 NOTE — FSED PROVIDER NOTE
Subjective   History of Present Illness  The patient is a 23-year-old female who presents to the ER with tailbone pain.  Patient reports she was rollerskating today and fell on her bottom.  Patient reports hurts to sit or stand.    History provided by:  Patient      Review of Systems   Musculoskeletal:         Patient with tailbone pain.       Past Medical History:   Diagnosis Date    Hyperemesis gravidarum        No Known Allergies    No past surgical history on file.    No family history on file.    Social History     Socioeconomic History    Marital status:    Tobacco Use    Smoking status: Never    Smokeless tobacco: Never   Vaping Use    Vaping status: Every Day    Substances: Nicotine    Devices: Disposable   Substance and Sexual Activity    Alcohol use: Never    Drug use: Yes     Frequency: 6.0 times per week     Types: Marijuana    Sexual activity: Defer           Objective   Physical Exam  Vitals and nursing note reviewed.   Constitutional:       Appearance: Normal appearance.       Musculoskeletal:         General: Normal range of motion.        Back:       Comments: Patient with pain on palpation to the lower back that extends down into her sacrum and coccyx area after she fell while skating.  Patient denies any bowel, bladder incontinence patient denies any saddle anesthesia.  Patient ambulating without any problems.   Skin:     General: Skin is warm and dry.   Neurological:      General: No focal deficit present.      Mental Status: She is alert and oriented to person, place, and time.   Psychiatric:         Mood and Affect: Mood normal.         Behavior: Behavior normal. Behavior is cooperative.         Procedures           ED Course  ED Course as of 04/03/24 1529   Wed Apr 03, 2024   1514 XR SACRUM AND COCCYX     Date of Exam: 4/3/2024 2:47 PM EDT     Indication: fell while skating, l     Comparison: None available.     Findings:  There is a transverse fracture involving the lower sacrum at  approximately the S3/4 level. Transitional lumbosacral anatomy partially imaged with the sacralized right L5 transverse process. Evaluation of the sacrum on the AP view limited due to large   amount of overlying stool. The iliopectineal and ilioischial lines are intact.     IMPRESSION:  Impression:  Transverse fracture involving the lower sacrum at the S3/4 level.   [DS]      ED Course User Index  [DS] MillertonAlanis goss MISSY Salazar                                           Medical Decision Making  Patient with pain on palpation to the lower back that extends down into her sacrum and coccyx area after she fell while skating.  Patient denies any bowel, bladder incontinence patient denies any saddle anesthesia.  Patient ambulating without any problems.    X-ray shows transverse fracture involving the lower side, at level 3\4.  Will have patient follow-up with orthopedics. Patient advised to get a foam donut to sit on, needs RX for tylenol, due to living a [facility] facility.         Amount and/or Complexity of Data Reviewed  Radiology: ordered. Decision-making details documented in ED Course.    Risk  OTC drugs.  Prescription drug management.        Final diagnoses:   Fall from roller shoes, initial encounter   Sacrum and coccyx fracture, closed, initial encounter       ED Disposition  ED Disposition       ED Disposition   Discharge    Condition   Stable    Comment   --               Hansel Valerio MD  [address] [place] [address] [phone]          Sundar Kaur MD  [address] [facility] [place] [id number] [phone]      [facility]         Medication List        New Prescriptions      acetaminophen 500 MG tablet  Commonly known as: TYLENOL  Take 2 tablets by mouth Every 6 (Six) Hours As Needed for Mild Pain or Moderate Pain for up to 30 days.               Where to Get Your Medications        These medications were sent to [facility]  [place], [place] - [address] [facility] [phone]  - [phone]   [address] [place] [address]      Phone: [phone]   acetaminophen 500 MG tablet

## 2024-04-03 NOTE — DISCHARGE INSTRUCTIONS
You can also follow-up with orthopedics Dr. Kaur, for further evaluation and treatment.  Make sure your insurance does not require a speciality referral before seeing, if so you need to see PCP     Ice and elevate to help with pain and swelling.     You can get a follow-up at the drugstore to sit on.    Tylenol/Motrin as needed for pain/fevers    Make sure patient is drinking plenty of fluids.    Return for any new or worsening symptoms.

## 2025-08-18 ENCOUNTER — HOSPITAL ENCOUNTER (EMERGENCY)
Facility: HOSPITAL | Age: 25
Discharge: HOME | End: 2025-08-18
Attending: EMERGENCY MEDICINE | Admitting: EMERGENCY MEDICINE
Payer: COMMERCIAL

## 2025-08-18 VITALS
TEMPERATURE: 99.1 F | DIASTOLIC BLOOD PRESSURE: 83 MMHG | RESPIRATION RATE: 18 BRPM | SYSTOLIC BLOOD PRESSURE: 116 MMHG | OXYGEN SATURATION: 97 % | HEIGHT: 62 IN | WEIGHT: 107 LBS | HEART RATE: 116 BPM | BODY MASS INDEX: 19.69 KG/M2

## 2025-08-18 DIAGNOSIS — F12.10 MARIJUANA ABUSE: Primary | ICD-10-CM
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
## 2025-08-18 PROCEDURE — 99283 EMERGENCY DEPT VISIT LOW MDM: CPT
